# Patient Record
(demographics unavailable — no encounter records)

---

## 2025-05-21 NOTE — REVIEW OF SYSTEMS
[Negative] : Neurological [FreeTextEntry3] : See HPI [FreeTextEntry4] : See HPI [FreeTextEntry6] : See HPI [FreeTextEntry7] : See HPI [FreeTextEntry8] : BPH on Flomax [de-identified] : Hyperpigmented lesion under his left arm.  He has seen a dermatologist and had a biopsy.  It was nonmalignant and no specific diagnosis was made other than hyperpigmentation.

## 2025-05-21 NOTE — HISTORY OF PRESENT ILLNESS
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  He is in good general health.  He has the following conditions: Very high levels of IgE.  He suffers from seasonal allergy and some mild urticaria.  He takes montelukast, Nasacort.  He also has asthma with small airway disease and uses Symbicort.  He has had pulmonary function tests and CT test which do not suggest COPD. Strong family history of coronary artery disease.  Both brothers and father had myocardial infarctions at young ages.  Patient had a coronary calcium score of 0 however because of his strong family history he was placed on ezetimibe 10 mg daily.  He tried a statin at 1 time but he had significant myalgias.  This occurred with several attempts to substitute different statins. Patient is prediabetic and has a very strong family history of diabetes.  2 siblings had diabetes with renal failure and both are . Patient has a mild smoking history, less than 5 pack years.  He has not smoked in many years. Patient has a history of migraine headaches which she treats with NSAIDs at the first sign of a headache. Patient had detached retina in each eye when he was young man due to trauma from contact sports.  He needed a buckle placed in his right eye. Patient had a peptic ulcer 25 years ago which resolved after treating H. pylori. Patient had a tibial plateau fracture of his right leg from skiing in the past Patient also had a herniated nucleus pulposus L4-L5 several years ago treated conservatively and improved. From a health maintenance perspective, patient had a colonoscopy within the last couple years which was completely normal.  He notes some mild hemorrhoids and occasional bleeding after bowel movement.  He recently developed constipation which he treats with MiraLAX every day.  Patient had shingles vaccine and Tdap in .  He never had pneumonia vaccine.

## 2025-05-21 NOTE — PHYSICAL EXAM
[Normal] : soft, non-tender, non-distended, no masses palpated, no HSM and normal bowel sounds [Testes Tenderness] : no tenderness of the testes [Testes Mass (___cm)] : there were no testicular masses [FreeTextEntry1] : No hernia